# Patient Record
Sex: FEMALE | Race: WHITE | NOT HISPANIC OR LATINO | Employment: OTHER
[De-identification: names, ages, dates, MRNs, and addresses within clinical notes are randomized per-mention and may not be internally consistent; named-entity substitution may affect disease eponyms.]

---

## 2017-08-08 PROBLEM — I10 ESSENTIAL HYPERTENSION: Status: ACTIVE | Noted: 2017-08-08

## 2017-08-08 PROBLEM — B07.9 VIRAL WARTS, UNSPECIFIED TYPE: Status: ACTIVE | Noted: 2017-08-08

## 2017-08-23 ENCOUNTER — HOSPITAL (OUTPATIENT)
Dept: OTHER | Age: 62
End: 2017-08-23

## 2017-08-24 PROBLEM — R31.9 HEMATURIA, UNSPECIFIED TYPE: Status: ACTIVE | Noted: 2017-08-24

## 2017-08-24 PROCEDURE — 81001 URINALYSIS AUTO W/SCOPE: CPT | Performed by: FAMILY MEDICINE

## 2017-09-05 PROBLEM — I10 WHITE COAT SYNDROME WITH HYPERTENSION: Status: ACTIVE | Noted: 2017-09-05

## 2017-09-05 PROCEDURE — 87624 HPV HI-RISK TYP POOLED RSLT: CPT | Performed by: PHYSICIAN ASSISTANT

## 2017-09-05 PROCEDURE — 88175 CYTOPATH C/V AUTO FLUID REDO: CPT | Performed by: PHYSICIAN ASSISTANT

## 2017-09-06 ENCOUNTER — IMAGING SERVICES (OUTPATIENT)
Dept: OTHER | Age: 62
End: 2017-09-06

## 2017-09-06 ENCOUNTER — HOSPITAL (OUTPATIENT)
Dept: OTHER | Age: 62
End: 2017-09-06

## 2017-09-20 PROCEDURE — 81001 URINALYSIS AUTO W/SCOPE: CPT | Performed by: FAMILY MEDICINE

## 2017-10-05 PROBLEM — R31.29 MICROSCOPIC HEMATURIA: Status: ACTIVE | Noted: 2017-10-05

## 2017-10-19 PROBLEM — N20.0 NEPHROLITHIASIS: Status: ACTIVE | Noted: 2017-10-19

## 2018-09-25 PROBLEM — R31.29 MICROSCOPIC HEMATURIA: Status: RESOLVED | Noted: 2017-10-05 | Resolved: 2018-09-25

## 2018-09-25 PROBLEM — E78.2 MIXED HYPERLIPIDEMIA: Status: ACTIVE | Noted: 2018-09-25

## 2018-09-25 PROBLEM — R31.9 HEMATURIA, UNSPECIFIED TYPE: Status: RESOLVED | Noted: 2017-08-24 | Resolved: 2018-09-25

## 2018-09-25 PROCEDURE — 81003 URINALYSIS AUTO W/O SCOPE: CPT | Performed by: FAMILY MEDICINE

## 2018-09-25 PROCEDURE — 86803 HEPATITIS C AB TEST: CPT | Performed by: FAMILY MEDICINE

## 2018-10-26 PROBLEM — M85.89 OSTEOPENIA OF MULTIPLE SITES: Status: ACTIVE | Noted: 2018-10-26

## 2018-10-29 ENCOUNTER — HOSPITAL (OUTPATIENT)
Dept: OTHER | Age: 63
End: 2018-10-29

## 2018-10-29 ENCOUNTER — IMAGING SERVICES (OUTPATIENT)
Dept: OTHER | Age: 63
End: 2018-10-29

## 2019-06-07 RX ORDER — ACETAMINOPHEN 500 MG
1000 TABLET ORAL ONCE
Status: CANCELLED | OUTPATIENT
Start: 2019-06-07 | End: 2019-06-07

## 2019-06-09 NOTE — H&P
Click to print Nicole [address]ai 415 for scanning     Office Visit     6/7/2019  [facility]      Teodoro Larkin MD       SURGERY, ORTHOPEDIC   Closed nondisplaced fracture of head of left radius, initial encoun Smoking status: Never Smoker      Smokeless tobacco: Never Used    Alcohol use: Yes      Alcohol/week: 0.0 - 1.2 oz    Drug use:  No            REVIEW OF SYSTEMS:   A 12 point review of systems was performed as documented on the intake form and reviewed

## 2019-06-10 ENCOUNTER — HOSPITAL ENCOUNTER (OUTPATIENT)
Facility: HOSPITAL | Age: 64
Setting detail: HOSPITAL OUTPATIENT SURGERY
Discharge: HOME | End: 2019-06-10
Attending: ORTHOPAEDIC SURGERY | Admitting: ORTHOPAEDIC SURGERY
Payer: COMMERCIAL

## 2019-06-10 ENCOUNTER — APPOINTMENT (OUTPATIENT)
Dept: GENERAL RADIOLOGY | Facility: HOSPITAL | Age: 64
End: 2019-06-10
Attending: ORTHOPAEDIC SURGERY
Payer: COMMERCIAL

## 2019-06-10 VITALS
WEIGHT: 185.19 LBS | SYSTOLIC BLOOD PRESSURE: 141 MMHG | RESPIRATION RATE: 18 BRPM | HEART RATE: 89 BPM | DIASTOLIC BLOOD PRESSURE: 90 MMHG | BODY MASS INDEX: 30.48 KG/M2 | OXYGEN SATURATION: 96 % | TEMPERATURE: 100 F | HEIGHT: 65.5 IN

## 2019-06-10 DIAGNOSIS — S52.125A CLOSED NONDISPLACED FRACTURE OF HEAD OF LEFT RADIUS, INITIAL ENCOUNTER: ICD-10-CM

## 2019-06-10 DIAGNOSIS — S42.452A CLOSED FRACTURE OF CAPITELLUM OF DISTAL HUMERUS, LEFT, INITIAL ENCOUNTER: ICD-10-CM

## 2019-06-10 PROCEDURE — 0PSG04Z REPOSITION LEFT HUMERAL SHAFT WITH INTERNAL FIXATION DEVICE, OPEN APPROACH: ICD-10-PCS | Performed by: ORTHOPAEDIC SURGERY

## 2019-06-10 PROCEDURE — 0PSJ04Z REPOSITION LEFT RADIUS WITH INTERNAL FIXATION DEVICE, OPEN APPROACH: ICD-10-PCS | Performed by: ORTHOPAEDIC SURGERY

## 2019-06-10 PROCEDURE — 76942 ECHO GUIDE FOR BIOPSY: CPT | Performed by: ORTHOPAEDIC SURGERY

## 2019-06-10 PROCEDURE — 76000 FLUOROSCOPY <1 HR PHYS/QHP: CPT | Performed by: ORTHOPAEDIC SURGERY

## 2019-06-10 DEVICE — 16.0MM, MICRO ACUTRAK 2® BONE SCREW
Type: IMPLANTABLE DEVICE | Site: ELBOW | Status: FUNCTIONAL
Brand: ACUMED

## 2019-06-10 DEVICE — 24.0MM, MINI ACUTRAK 2® BONE SCREW
Type: IMPLANTABLE DEVICE | Site: ELBOW | Status: FUNCTIONAL
Brand: ACUMED

## 2019-06-10 RX ORDER — HYDROCODONE BITARTRATE AND ACETAMINOPHEN 5; 325 MG/1; MG/1
1-2 TABLET ORAL EVERY 6 HOURS PRN
Qty: 30 TABLET | Refills: 0 | Status: SHIPPED | OUTPATIENT
Start: 2019-06-10 | End: 2019-06-20

## 2019-06-10 RX ORDER — ACETAMINOPHEN 500 MG
1000 TABLET ORAL ONCE
COMMUNITY

## 2019-06-10 RX ORDER — SODIUM CHLORIDE, SODIUM LACTATE, POTASSIUM CHLORIDE, CALCIUM CHLORIDE 600; 310; 30; 20 MG/100ML; MG/100ML; MG/100ML; MG/100ML
INJECTION, SOLUTION INTRAVENOUS CONTINUOUS
Status: DISCONTINUED | OUTPATIENT
Start: 2019-06-10 | End: 2019-06-10

## 2019-06-10 RX ORDER — ONDANSETRON 2 MG/ML
4 INJECTION INTRAMUSCULAR; INTRAVENOUS ONCE
Status: COMPLETED | OUTPATIENT
Start: 2019-06-10 | End: 2019-06-10

## 2019-06-10 RX ORDER — CEFAZOLIN SODIUM/WATER 2 G/20 ML
2 SYRINGE (ML) INTRAVENOUS ONCE
Status: COMPLETED | OUTPATIENT
Start: 2019-06-10 | End: 2019-06-10

## 2019-06-10 RX ORDER — SCOLOPAMINE TRANSDERMAL SYSTEM 1 MG/1
1 PATCH, EXTENDED RELEASE TRANSDERMAL ONCE
Status: DISCONTINUED | OUTPATIENT
Start: 2019-06-10 | End: 2019-06-10

## 2019-06-10 NOTE — BRIEF OP NOTE
Pre-Operative Diagnosis: Closed nondisplaced fracture of head of left radius, initial encounter [S52.125A]  Closed fracture of capitellum of distal humerus, left, initial encounter [S42.132A]     Post-Operative Diagnosis: Closed nondisplaced fracture of he

## 2019-06-10 NOTE — INTERVAL H&P NOTE
Pre-op Diagnosis: Closed nondisplaced fracture of head of left radius, initial encounter [S52.125A]  Closed fracture of capitellum of distal humerus, left, initial encounter [S42.452A]    The above referenced H&P was reviewed by Kirk Jiménez MD on 6/10/

## 2019-06-10 NOTE — INTERVAL H&P NOTE
Pre-op Diagnosis: Closed nondisplaced fracture of head of left radius, initial encounter [S52.125A]  Closed fracture of capitellum of distal humerus, left, initial encounter [S42.452A]    The above referenced H&P was reviewed by Marquise Alanis MD on 6/10/

## 2019-06-11 NOTE — OPERATIVE REPORT
[facility]    PATIENT'S NAME: Faustino SAUCEDO   ATTENDING PHYSICIAN: Faith Arce M.D. OPERATING PHYSICIAN: Faith Arce M.D.    PATIENT ACCOUNT#:   [de-identified]    LOCATION:  [address] [place] [address]  MEDICAL RECORD #:   [id number] was removed. The main fragment was pinned with the micro Acutrak set, and a 16 mm screw was placed after drilling. Arm was put through a range of motion. No further loose bodies were seen.   Excellent position on the AP, lateral and oblique views was see

## 2019-08-02 PROBLEM — S52.125D CLOSED NONDISPLACED FRACTURE OF HEAD OF LEFT RADIUS WITH ROUTINE HEALING, SUBSEQUENT ENCOUNTER: Status: ACTIVE | Noted: 2019-08-02

## 2019-11-05 PROBLEM — Z56.6 WORK STRESS: Status: ACTIVE | Noted: 2019-11-05

## 2019-11-05 PROBLEM — S52.125D CLOSED NONDISPLACED FRACTURE OF HEAD OF LEFT RADIUS WITH ROUTINE HEALING, SUBSEQUENT ENCOUNTER: Status: RESOLVED | Noted: 2019-08-02 | Resolved: 2019-11-05

## 2019-11-06 PROBLEM — R31.9 HEMATURIA, UNSPECIFIED TYPE: Status: ACTIVE | Noted: 2019-11-06

## 2019-11-06 PROBLEM — R73.09 ELEVATED GLUCOSE: Status: ACTIVE | Noted: 2019-11-06

## 2019-11-08 PROBLEM — R73.03 PREDIABETES: Status: ACTIVE | Noted: 2019-11-06

## 2021-06-28 ENCOUNTER — IMAGING SERVICES (OUTPATIENT)
Dept: MAMMOGRAPHY | Age: 66
End: 2021-06-28
Attending: INTERNAL MEDICINE

## 2021-06-28 DIAGNOSIS — Z12.31 ENCOUNTER FOR SCREENING MAMMOGRAM FOR MALIGNANT NEOPLASM OF BREAST: ICD-10-CM

## 2021-06-28 PROCEDURE — 77067 SCR MAMMO BI INCL CAD: CPT | Performed by: RADIOLOGY

## 2021-06-28 PROCEDURE — 77063 BREAST TOMOSYNTHESIS BI: CPT | Performed by: RADIOLOGY

## 2022-08-04 ENCOUNTER — IMAGING SERVICES (OUTPATIENT)
Dept: MAMMOGRAPHY | Age: 67
End: 2022-08-04

## 2022-08-04 DIAGNOSIS — Z12.31 ENCOUNTER FOR SCREENING MAMMOGRAM FOR MALIGNANT NEOPLASM OF BREAST: ICD-10-CM

## 2022-08-04 PROCEDURE — 77063 BREAST TOMOSYNTHESIS BI: CPT | Performed by: RADIOLOGY

## 2022-08-04 PROCEDURE — 77067 SCR MAMMO BI INCL CAD: CPT | Performed by: RADIOLOGY

## 2023-07-24 DIAGNOSIS — Z12.39 BREAST CANCER SCREENING: Primary | ICD-10-CM

## 2023-08-31 ENCOUNTER — IMAGING SERVICES (OUTPATIENT)
Dept: MAMMOGRAPHY | Age: 68
End: 2023-08-31
Attending: INTERNAL MEDICINE

## 2023-08-31 DIAGNOSIS — Z12.39 BREAST CANCER SCREENING: ICD-10-CM

## 2023-08-31 PROCEDURE — 77067 SCR MAMMO BI INCL CAD: CPT | Performed by: RADIOLOGY

## 2023-08-31 PROCEDURE — 77063 BREAST TOMOSYNTHESIS BI: CPT | Performed by: RADIOLOGY

## (undated) DEVICE — SUTURE VICRYL 1 OS-6

## (undated) DEVICE — PADDING CAST SOFT ROLL 4\" STER

## (undated) DEVICE — MICRO ACUTRAK 2® EXTENDED LONG DRILL: Brand: ACUMED

## (undated) DEVICE — #15 STERILE STAINLESS BLADE: Brand: STERILE STAINLESS BLADES

## (undated) DEVICE — 3M™ STERI-STRIP™ REINFORCED ADHESIVE SKIN CLOSURES, R1547, 1/2 IN X 4 IN (12 MM X 100 MM), 6 STRIPS/ENVELOPE: Brand: 3M™ STERI-STRIP™

## (undated) DEVICE — SOL  .9 1000ML BTL

## (undated) DEVICE — MINI ACUTRAK 2® DRILL, LONG: Brand: ACUMED

## (undated) DEVICE — SUTURE VICRYL 0 CP-2

## (undated) DEVICE — GLOVE SURG TRIUMPH SZ 8

## (undated) DEVICE — GAMMEX® PI HYBRID SIZE 7.5, STERILE POWDER-FREE SURGICAL GLOVE, POLYISOPRENE AND NEOPRENE BLEND: Brand: GAMMEX

## (undated) DEVICE — INTENDED TO BE USED TO OCCLUDE, RETRACT AND IDENTIFY ARTERIES, VEINS, TENDONS AND NERVES IN SURGICAL PROCEDURES: Brand: STERION®  VESSEL LOOP

## (undated) DEVICE — UNDYED BRAIDED (POLYGLACTIN 910), SYNTHETIC ABSORBABLE SUTURE: Brand: COATED VICRYL

## (undated) DEVICE — .045" X 6" ST GUIDE WIRE: Brand: ACUMED

## (undated) DEVICE — KENDALL SCD EXPRESS SLEEVES, KNEE LENGTH, MEDIUM: Brand: KENDALL SCD

## (undated) DEVICE — UPPER EXTREMITY CDS-LF: Brand: MEDLINE INDUSTRIES, INC.

## (undated) DEVICE — GOWN,SIRUS,FABRIC-REINFORCED,X-LARGE: Brand: MEDLINE

## (undated) DEVICE — SUTURE VICRYL 3-0 SH

## (undated) DEVICE — DRAPE,U/SHT,SPLIT,FILM,60X84,STERILE: Brand: MEDLINE

## (undated) DEVICE — ARM SLING

## (undated) DEVICE — .035 X 6 IN SINGLE TROCAR GUIDE WIRE: Brand: ACUMED

## (undated) DEVICE — DRAPE C-ARM UNIVERSAL

## (undated) DEVICE — ADHESIVE MASTISOL 2/3CC VL

## (undated) DEVICE — PADDING CAST SOFT ROLL 4\"

## (undated) DEVICE — ZIMMER® STERILE DISPOSABLE TOURNIQUET CUFF WITH PLC, DUAL PORT, SINGLE BLADDER, 18 IN. (46 CM)

## (undated) DEVICE — SUPER SPONGES,MEDIUM: Brand: KERLIX

## (undated) DEVICE — SUTURE MONOCRYL 4-0 PS-2

## (undated) DEVICE — DISPOSABLE BIPOLAR FORCEPS 4" (10.2CM) JEWELERS, STRAIGHT 0.4MM TIP AND 12 FT. (3.6M) CABLE: Brand: KIRWAN

## (undated) DEVICE — SPLINT PRECUT SYNTH 4X30

## (undated) NOTE — LETTER
[facility] Department  Phone: [phone]  Right Fax: [phone]  [facility] 20 By:  Joyce Jay RN Date: 6/7/19    Patient Name: Albcolt [place]  Surgery Date: 6/10/2019    CSN: [id number]  Medical Record: [id number]

## (undated) NOTE — LETTER
[facility]  Phone: [phone]  OUTSIDE TESTING RESULT REQUEST      TO:   [facility]      Today's Date: 6/7/19    FAX #: [phone]     IMPORTANT: FOR YOUR IMMEDIATE ATTENTION  Please FAX all test results listed below to: 61

## (undated) NOTE — LETTER
Sarai Steel [address] [address]  [place], [address] [facility]    Consent for Operation  Date: __________________                                Time: _______________    1.  I authorize the performance upon Mio Pham the following operation:  Proc procedure has been videotaped, the surgeon will obtain the original videotape. The hospital will not be responsible for storage or maintenance of this tape.   7. For the purpose of advancing medical education, I consent to the admittance of observers to the STATEMENTS REQUIRING INSERTION OR COMPLETION WERE FILLED IN.     Signature of Patient:   ___________________________    When the patient is a minor or mentally incompetent to give consent:  Signature of person authorized to consent for patient: ____________ supplements, and pills I can buy without a prescription (including street drugs/illegal medications). Failure to inform my anesthesiologist about these medicines may increase my risk of anesthetic complications. iv.  If I am allergic to anything or have ha Anesthesiologist Signature     Date   Time  I have discussed the procedure and information above with the patient (or patient’s representative) and answered their questions. The patient or their representative has agreed to have anesthesia services.     ___